# Patient Record
Sex: FEMALE | Race: BLACK OR AFRICAN AMERICAN
[De-identification: names, ages, dates, MRNs, and addresses within clinical notes are randomized per-mention and may not be internally consistent; named-entity substitution may affect disease eponyms.]

---

## 2019-06-07 ENCOUNTER — HOSPITAL ENCOUNTER (EMERGENCY)
Dept: HOSPITAL 72 - EMR | Age: 59
Discharge: HOME | End: 2019-06-07
Payer: MEDICARE

## 2019-06-07 VITALS — HEIGHT: 61 IN | BODY MASS INDEX: 22.66 KG/M2 | WEIGHT: 120 LBS

## 2019-06-07 VITALS — DIASTOLIC BLOOD PRESSURE: 66 MMHG | SYSTOLIC BLOOD PRESSURE: 141 MMHG

## 2019-06-07 VITALS — SYSTOLIC BLOOD PRESSURE: 146 MMHG | DIASTOLIC BLOOD PRESSURE: 65 MMHG

## 2019-06-07 DIAGNOSIS — R10.31: Primary | ICD-10-CM

## 2019-06-07 DIAGNOSIS — K76.9: ICD-10-CM

## 2019-06-07 DIAGNOSIS — R74.8: ICD-10-CM

## 2019-06-07 DIAGNOSIS — D25.9: ICD-10-CM

## 2019-06-07 LAB
ADD MANUAL DIFF: YES
ALBUMIN SERPL-MCNC: 3.7 G/DL (ref 3.4–5)
ALBUMIN/GLOB SERPL: 0.9 {RATIO} (ref 1–2.7)
ALP SERPL-CCNC: 85 U/L (ref 46–116)
ALT SERPL-CCNC: 21 U/L (ref 12–78)
ANION GAP SERPL CALC-SCNC: 6 MMOL/L (ref 5–15)
APPEARANCE UR: CLEAR
APTT BLD: 25 SEC (ref 23–33)
APTT PPP: (no result) S
AST SERPL-CCNC: 26 U/L (ref 15–37)
BILIRUB SERPL-MCNC: 0.3 MG/DL (ref 0.2–1)
BUN SERPL-MCNC: 11 MG/DL (ref 7–18)
CALCIUM SERPL-MCNC: 9.3 MG/DL (ref 8.5–10.1)
CHLORIDE SERPL-SCNC: 106 MMOL/L (ref 98–107)
CO2 SERPL-SCNC: 28 MMOL/L (ref 21–32)
CREAT SERPL-MCNC: 0.8 MG/DL (ref 0.55–1.3)
ERYTHROCYTE [DISTWIDTH] IN BLOOD BY AUTOMATED COUNT: 12.3 % (ref 11.6–14.8)
GLOBULIN SER-MCNC: 4.2 G/DL
GLUCOSE UR STRIP-MCNC: NEGATIVE MG/DL
HCT VFR BLD CALC: 35.7 % (ref 37–47)
HGB BLD-MCNC: 11.6 G/DL (ref 12–16)
INR PPP: 1 (ref 0.9–1.1)
KETONES UR QL STRIP: NEGATIVE
LEUKOCYTE ESTERASE UR QL STRIP: NEGATIVE
MCV RBC AUTO: 95 FL (ref 80–99)
NITRITE UR QL STRIP: NEGATIVE
PH UR STRIP: 5 [PH] (ref 4.5–8)
PLATELET # BLD: 206 K/UL (ref 150–450)
POTASSIUM SERPL-SCNC: 4 MMOL/L (ref 3.5–5.1)
PROT UR QL STRIP: NEGATIVE
RBC # BLD AUTO: 3.77 M/UL (ref 4.2–5.4)
SODIUM SERPL-SCNC: 140 MMOL/L (ref 136–145)
SP GR UR STRIP: 1.02 (ref 1–1.03)
UROBILINOGEN UR-MCNC: NORMAL MG/DL (ref 0–1)
WBC # BLD AUTO: 4.1 K/UL (ref 4.8–10.8)

## 2019-06-07 PROCEDURE — 74177 CT ABD & PELVIS W/CONTRAST: CPT

## 2019-06-07 PROCEDURE — 85730 THROMBOPLASTIN TIME PARTIAL: CPT

## 2019-06-07 PROCEDURE — 80053 COMPREHEN METABOLIC PANEL: CPT

## 2019-06-07 PROCEDURE — 96374 THER/PROPH/DIAG INJ IV PUSH: CPT

## 2019-06-07 PROCEDURE — 85025 COMPLETE CBC W/AUTO DIFF WBC: CPT

## 2019-06-07 PROCEDURE — 85007 BL SMEAR W/DIFF WBC COUNT: CPT

## 2019-06-07 PROCEDURE — 81003 URINALYSIS AUTO W/O SCOPE: CPT

## 2019-06-07 PROCEDURE — 83690 ASSAY OF LIPASE: CPT

## 2019-06-07 PROCEDURE — 36415 COLL VENOUS BLD VENIPUNCTURE: CPT

## 2019-06-07 PROCEDURE — 96375 TX/PRO/DX INJ NEW DRUG ADDON: CPT

## 2019-06-07 PROCEDURE — 85610 PROTHROMBIN TIME: CPT

## 2019-06-07 PROCEDURE — 99284 EMERGENCY DEPT VISIT MOD MDM: CPT

## 2019-06-07 NOTE — DIAGNOSTIC IMAGING REPORT
Clinical Indication: Abdominal pain

 

Technique:   No oral contrast utilized, per emergency room physician request  IV

administration nonionic contrast. Venous phase spiral acquisition obtained through

the abdomen and pelvis. Multiplanar reconstructions were generated. Total dose length

product 584.1 mGycm. CTDIvol(s) 13.21 mGy. Dose reduction achieved using automated

exposure control

 

 

Comparison: none

 

Findings: Lack of enteric contrast limits assessment of the GI tract. The appendix is

normal. Moderate stool is seen in the ascending colon. No definite evidence of

diverticulosis or diverticulitis. No small bowel distention. No free or loculated

intraperitoneal gas or fluid. Distal esophagus, stomach, duodenum are unremarkable.

 

The liver demonstrates focal fatty infiltration in the usual location adjacent to the

falciform ligament. There is a subcentimeter low-attenuation lesion in segment 4A

which is too small to characterize. There is slight edema of the periportal fat The

gallbladder is unremarkable. No biliary ductal dilatation. The pancreas, spleen,

adrenals, left kidney are unremarkable. The right kidney demonstrates an upper pole

cyst. There is an intensely enhancing 2.2 cm mass in the right side of the pelvis,

most likely an exophytic uterine fibroid. No other pelvic mass or adenopathy. No

retroperitoneal or mesenteric mass or adenopathy.

 

The included lung bases are clear. The bones demonstrate degenerative spondylosis

changes at the lumbosacral junction 

 

Impression: Limited assessment of the GI tract, due to lack of enteric contrast

administration

 

No definite acute process

 

Slight periportal edema. This is a nonspecific finding, can be seen in congestive

heart failure or acute hepatitis

 

Intensely enhancing 2.2 cm mass in the right-sided pelvis, most likely an exophytic

uterine fibroid. Sonography recommended to confirm

 

Subcentimeter left lobe liver lesion, too small to characterize, most likely benign

simple cyst or bile hamartoma. No further follow-up necessary

 

Other findings as noted, including focal fatty infiltration of the left lobe of the

liver, degenerative spondylosis

 

The CT scanner at [facility] is accredited by the [employer] of

Radiology and the scans are performed using protocols designed to limit radiation

exposure to as low as reasonably achievable to attain images of sufficient resolution

adequate for diagnostic evaluation.

## 2019-06-07 NOTE — EMERGENCY ROOM REPORT
History of Present Illness


General


Chief Complaint:  Abdominal Pain


Source:  Patient





Present Illness


HPI


Patient presents emergency department today complaining of abdominal pain.  

Patient states that she has right lower quadrant abdominal pain for the last 

couple of days.  There is some associated nausea.  Patient has history of a 

hernia repair in the right inguinal area.  She was concerned that there was an 

issue with her hernia repair.  She denies any fever chest pain shortness of 

breath.  The pain does radiate a little bit to her back as well.  She denies 

any dysuria urinary frequency.  Denies any vaginal discharge.  Symptoms noted 

to be moderate to severe.  No other modifying factors.  No other associated 

signs and symptoms.  No other complaints were noted.


Allergies:  


Coded Allergies:  


     No Known Allergies (Unverified , 6/7/19)





Patient History


Past Medical History:  none


Past Surgical History:  other - Right inguinal hernia repair


Social History:  Denies: smoking, alcohol use, drug use


Last Menstrual Period:  menopause


Reviewed Nursing Documentation:  PMH: Agreed; PSxH: Agreed





Nursing Documentation-PMH


Past Medical History:  No History, Except For


Hx Cardiac Problems:  No - congenital aortic, 2 open heart surgery


Hx Gastrointestinal Problems:  No - hernia





Review of Systems


All Other Systems:  negative except mentioned in HPI





Physical Exam





Vital Signs








  Date Time  Temp Pulse Resp B/P (MAP) Pulse Ox O2 Delivery O2 Flow Rate FiO2


 


6/7/19 08:57 97.9 60 16 146/65 (92) 100 Room Air  








Sp02 EP Interpretation:  reviewed, normal


General Appearance:  normal inspection, well appearing, no apparent distress, 

alert


Head:  atraumatic


Eyes:  bilateral eye normal inspection


ENT:  normal ENT inspection, hearing grossly normal, normal voice


Neck:  normal inspection, full range of motion, supple, no bony tend


Respiratory:  normal inspection, lungs clear, normal breath sounds, no 

respiratory distress, no retraction, no wheezing


Cardiovascular #1:  regular rate, rhythm, no edema


Gastrointestinal:  normal inspection, normal bowel sounds, soft, no guarding, 

no hernia, tenderness - Right lower quadrant


Genitourinary:  no CVA tenderness


Musculoskeletal:  normal inspection, back normal, normal range of motion


Neurologic:  normal inspection, alert, responsive, speech normal


Psychiatric:  normal inspection, judgement/insight normal, mood/affect normal


Skin:  normal inspection, normal color, no rash





Medical Decision Making


Diagnostic Impression:  


 Primary Impression:  


 Abdominal pain


 Additional Impressions:  


 Elevated lipase


 Uterine fibroid


ER Course


Patient presents to the emergency department today complaining of abdominal 

pain.  Differential considerations include acute pancreatitis, cholecystitis, 

gastritis, hepatitis, appendicitis just to name a few.  Given the severity of 

the patient's presentation I felt this is a highly complex patient.  This 

patient required extensive workup.


Patient's laboratory work-up was not impressive except for slight elevation of 

lipase.  Patient's abdominal exam however is fairly benign in the epigastrium.  

Because of patient's right lower quadrant abdominal pain CT scan was performed.

  CT scan shows evidence of right pelvic mass.  Given that this is likely 

patient's cause for pain I felt the patient was stable for discharge.  There 

was no evidence of any appendicitis.  Patient was given prescription for pain 

medications.  Recommend outpatient follow-up with GI as well as OB/GYN.  

Advised to return to emergency room if symptoms are not improved in 2 days.





Labs








Test


  6/7/19


09:20 6/7/19


09:36


 


White Blood Count


  4.1 K/UL


(4.8-10.8) 


 


 


Red Blood Count


  3.77 M/UL


(4.20-5.40) 


 


 


Hemoglobin


  11.6 G/DL


(12.0-16.0) 


 


 


Hematocrit


  35.7 %


(37.0-47.0) 


 


 


Mean Corpuscular Volume 95 FL (80-99)  


 


Mean Corpuscular Hemoglobin


  30.8 PG


(27.0-31.0) 


 


 


Mean Corpuscular Hemoglobin


Concent 32.5 G/DL


(32.0-36.0) 


 


 


Red Cell Distribution Width


  12.3 %


(11.6-14.8) 


 


 


Platelet Count


  206 K/UL


(150-450) 


 


 


Mean Platelet Volume


  6.3 FL


(6.5-10.1) 


 


 


Neutrophils (%) (Auto)  % (45.0-75.0)  


 


Lymphocytes (%) (Auto)  % (20.0-45.0)  


 


Monocytes (%) (Auto)  % (1.0-10.0)  


 


Eosinophils (%) (Auto)  % (0.0-3.0)  


 


Basophils (%) (Auto)  % (0.0-2.0)  


 


Differential Total Cells


Counted 100 


  


 


 


Neutrophils % (Manual) 36 % (45-75)  


 


Lymphocytes % (Manual) 59 % (20-45)  


 


Monocytes % (Manual) 4 % (1-10)  


 


Eosinophils % (Manual) 0 % (0-3)  


 


Basophils % (Manual) 1 % (0-2)  


 


Band Neutrophils 0 % (0-8)  


 


Platelet Estimate Adequate  


 


Platelet Morphology Normal  


 


Red Blood Cell Morphology Normal  


 


Prothrombin Time


  10.8 SEC


(9.30-11.50) 


 


 


Prothromb Time International


Ratio 1.0 (0.9-1.1) 


  


 


 


Activated Partial


Thromboplast Time 25 SEC (23-33) 


  


 


 


Sodium Level


  140 MMOL/L


(136-145) 


 


 


Potassium Level


  4.0 MMOL/L


(3.5-5.1) 


 


 


Chloride Level


  106 MMOL/L


() 


 


 


Carbon Dioxide Level


  28 MMOL/L


(21-32) 


 


 


Anion Gap


  6 mmol/L


(5-15) 


 


 


Blood Urea Nitrogen


  11 mg/dL


(7-18) 


 


 


Creatinine


  0.8 MG/DL


(0.55-1.30) 


 


 


Estimat Glomerular Filtration


Rate > 60 mL/min


(>60) 


 


 


Glucose Level


  91 MG/DL


() 


 


 


Calcium Level


  9.3 MG/DL


(8.5-10.1) 


 


 


Total Bilirubin


  0.3 MG/DL


(0.2-1.0) 


 


 


Aspartate Amino Transf


(AST/SGOT) 26 U/L (15-37) 


  


 


 


Alanine Aminotransferase


(ALT/SGPT) 21 U/L (12-78) 


  


 


 


Alkaline Phosphatase


  85 U/L


() 


 


 


Total Protein


  7.9 G/DL


(6.4-8.2) 


 


 


Albumin


  3.7 G/DL


(3.4-5.0) 


 


 


Globulin 4.2 g/dL  


 


Albumin/Globulin Ratio 0.9 (1.0-2.7)  


 


Lipase


  563 U/L


() 


 


 


Urine Color  Pale yellow 


 


Urine Appearance  Clear 


 


Urine pH  5 (4.5-8.0) 


 


Urine Specific Gravity


  


  1.020


(1.005-1.035)


 


Urine Protein


  


  Negative


(NEGATIVE)


 


Urine Glucose (UA)


  


  Negative


(NEGATIVE)


 


Urine Ketones


  


  Negative


(NEGATIVE)


 


Urine Blood


  


  Negative


(NEGATIVE)


 


Urine Nitrite


  


  Negative


(NEGATIVE)


 


Urine Bilirubin


  


  Negative


(NEGATIVE)


 


Urine Urobilinogen


  


  Normal MG/DL


(0.0-1.0)


 


Urine Leukocyte Esterase


  


  Negative


(NEGATIVE)








CT/MRI/US Diagnostic Results


CT/MRI/US Diagnostic Results :  


   Imaging Test Ordered:  CT abdomen pelvis: Right pelvic mass consistent with 

fibroid uterine





Last Vital Signs








  Date Time  Temp Pulse Resp B/P (MAP) Pulse Ox O2 Delivery O2 Flow Rate FiO2


 


6/7/19 11:26 97.9 62 17 141/66 100 Room Air  








Status:  improved


Disposition:  HOME, SELF-CARE


Condition:  Stable


Scripts


Docusate Sodium* (COLACE*) 100 Mg Capsule


100 MG ORAL TWICE A DAY for 14 Days, CAP


   Prov: Mak Nelson MD         6/7/19 


Hydrocodone Bit/Acetaminophen 5-325* (NORCO 5-325*) 1 Each Tablet


1 TAB ORAL Q6H PRN for For Pain, #10 TAB 0 Refills


   Prov: Mak Nelson MD         6/7/19


Patient Instructions:  Uterine Fibroids, Easy-to-Read, Abdominal Pain, Adult











Mak Nelson MD Jun 7, 2019 13:42